# Patient Record
Sex: MALE | Race: WHITE | Employment: OTHER | URBAN - METROPOLITAN AREA
[De-identification: names, ages, dates, MRNs, and addresses within clinical notes are randomized per-mention and may not be internally consistent; named-entity substitution may affect disease eponyms.]

---

## 2020-03-02 ENCOUNTER — OFFICE VISIT (OUTPATIENT)
Dept: URGENT CARE | Facility: URGENT CARE | Age: 75
End: 2020-03-02
Payer: COMMERCIAL

## 2020-03-02 VITALS
TEMPERATURE: 97.9 F | BODY MASS INDEX: 29.62 KG/M2 | SYSTOLIC BLOOD PRESSURE: 99 MMHG | OXYGEN SATURATION: 97 % | WEIGHT: 200 LBS | HEIGHT: 69 IN | HEART RATE: 110 BPM | DIASTOLIC BLOOD PRESSURE: 66 MMHG

## 2020-03-02 DIAGNOSIS — R00.0 SINUS TACHYCARDIA: ICD-10-CM

## 2020-03-02 DIAGNOSIS — R19.7 VOMITING AND DIARRHEA: Primary | ICD-10-CM

## 2020-03-02 DIAGNOSIS — R25.2 LEG CRAMPING: ICD-10-CM

## 2020-03-02 DIAGNOSIS — R11.10 VOMITING AND DIARRHEA: Primary | ICD-10-CM

## 2020-03-02 DIAGNOSIS — E86.1 HYPOTENSION DUE TO HYPOVOLEMIA: ICD-10-CM

## 2020-03-02 DIAGNOSIS — E86.0 DEHYDRATION: ICD-10-CM

## 2020-03-02 DIAGNOSIS — R23.4: ICD-10-CM

## 2020-03-02 DIAGNOSIS — K52.9 GASTROENTERITIS: ICD-10-CM

## 2020-03-02 PROCEDURE — 99204 OFFICE O/P NEW MOD 45 MIN: CPT | Performed by: INTERNAL MEDICINE

## 2020-03-02 RX ORDER — ATORVASTATIN CALCIUM 40 MG/1
40 TABLET, FILM COATED ORAL DAILY
COMMUNITY

## 2020-03-02 RX ORDER — LISINOPRIL 20 MG/1
20 TABLET ORAL DAILY
COMMUNITY

## 2020-03-02 RX ORDER — NICOTINE POLACRILEX 4 MG/1
20 GUM, CHEWING ORAL DAILY
COMMUNITY

## 2020-03-02 RX ORDER — ASPIRIN 81 MG/1
81 TABLET ORAL DAILY
COMMUNITY

## 2020-03-02 RX ORDER — LEVOTHYROXINE SODIUM 175 UG/1
175 TABLET ORAL DAILY
COMMUNITY

## 2020-03-02 RX ORDER — ONDANSETRON 8 MG/1
8 TABLET, ORALLY DISINTEGRATING ORAL EVERY 8 HOURS PRN
Qty: 30 TABLET | Refills: 0 | Status: SHIPPED | OUTPATIENT
Start: 2020-03-02

## 2020-03-02 RX ORDER — ONDANSETRON 4 MG/1
8 TABLET, ORALLY DISINTEGRATING ORAL ONCE
Status: COMPLETED | OUTPATIENT
Start: 2020-03-02 | End: 2020-03-02

## 2020-03-02 RX ADMIN — ONDANSETRON 8 MG: 4 TABLET, ORALLY DISINTEGRATING ORAL at 13:46

## 2020-03-02 ASSESSMENT — ENCOUNTER SYMPTOMS
FEVER: 0
VOMITING: 1
SORE THROAT: 0
ROS GI COMMENTS: MILD ABD PAIN
LIGHT-HEADEDNESS: 0
NAUSEA: 0
DIZZINESS: 0
DIARRHEA: 1
PALPITATIONS: 0
COUGH: 0
SLEEP DISTURBANCE: 1
HEADACHES: 0
MYALGIAS: 0
ADENOPATHY: 0
BLOOD IN STOOL: 0

## 2020-03-02 ASSESSMENT — MIFFLIN-ST. JEOR: SCORE: 1647.57

## 2020-03-02 NOTE — PROGRESS NOTES
SUBJECTIVE:   Suresh Poe is a 74 year old male presenting with a chief complaint of   Chief Complaint   Patient presents with     Urgent Care     Pt in clinic c/o vomiting and diarrhea.     Vomiting     Diarrhea       He is a new patient of [facility].    Gastro    Onset of symptoms was 1 day(s) ago.    Current and Associated symptoms:  vomiting and diarrhea  Aggravating factors: eating.    Alleviating factors:nothing  Diarrhea: Yes  Every 1/2 hour has stools/day and is persisting  Stools: watery  Vomitting: Yes  multiple times/day  Appetite: poor  Risk factors:   At [employer] yesterday morning with luncheon    otherwise unknown sick contacts, travel  and recent antibiotic use      Review of Systems   Constitutional: Negative for fever.   HENT: Negative for sore throat.    Eyes: Negative for visual disturbance.   Respiratory: Negative for cough.    Cardiovascular: Negative for palpitations.   Gastrointestinal: Positive for diarrhea and vomiting. Negative for blood in stool and nausea.        Mild abd pain   Genitourinary: Positive for decreased urine volume.   Musculoskeletal: Negative for myalgias.   Skin: Negative for rash.   Allergic/Immunologic: Negative for food allergies.   Neurological: Negative for dizziness, light-headedness and headaches.   Hematological: Negative for adenopathy.   Psychiatric/Behavioral: Positive for sleep disturbance.       Past Medical History:   Diagnosis Date     Acid reflux      Diabetes (H)      Hyperlipidemia      Hypertension      Hypothyroidism        Family History   Problem Relation Age of Onset     Kidney failure Mother         Refused hemodialysis.  Diet early 90s.     Cancer Father         Mesothelioma     Current Outpatient Medications   Medication Sig Dispense Refill     aspirin 81 MG EC tablet Take 81 mg by mouth daily       atorvastatin (LIPITOR) 40 MG tablet Take 40 mg by mouth daily       levothyroxine (SYNTHROID/LEVOTHROID) 175 MCG tablet Take 175 mcg by mouth daily    "    lisinopril (ZESTRIL) 20 MG tablet Take 20 mg by mouth daily       metFORMIN (GLUCOPHAGE) 500 MG tablet Take 500 mg by mouth 2 times daily (with meals)       omeprazole 20 MG tablet Take 20 mg by mouth daily       ondansetron (ZOFRAN-ODT) 8 MG ODT tab Take 1 tablet (8 mg) by mouth every 8 hours as needed for nausea 30 tablet 0     Social History     Tobacco Use     Smoking status: Former Smoker     Smokeless tobacco: Never Used   Substance Use Topics     Alcohol use: Not on file       OBJECTIVE  BP 99/66   Pulse 110   Temp 97.9  F (36.6  C) (Oral)   Ht 1.753 m (5' 9\")   Wt 90.7 kg (200 lb)   SpO2 97%   BMI 29.53 kg/m      Physical Exam  Vitals signs reviewed.   Constitutional:       Appearance: He is ill-appearing. He is not toxic-appearing.   HENT:      Right Ear: Tympanic membrane normal.      Left Ear: Tympanic membrane normal.      Nose: Nose normal.      Mouth/Throat:      Mouth: Mucous membranes are moist.      Pharynx: No oropharyngeal exudate.   Eyes:      Conjunctiva/sclera: Conjunctivae normal.   Cardiovascular:      Rate and Rhythm: Regular rhythm. Tachycardia present.      Pulses: Normal pulses.      Heart sounds: Normal heart sounds.   Pulmonary:      Effort: Pulmonary effort is normal.      Breath sounds: Normal breath sounds.   Abdominal:      Palpations: Abdomen is soft.      Tenderness: There is no abdominal tenderness. There is no guarding or rebound.      Comments: Hypoactive bowel sounds   Skin:     Comments: Decreased skin turgor on forearms   Neurological:      General: No focal deficit present.      Mental Status: He is alert.         Labs:  No results found for this or any previous visit (from the past 24 hour(s)).        ASSESSMENT:      ICD-10-CM    1. Vomiting and diarrhea R11.10 ondansetron (ZOFRAN-ODT) ODT tab 8 mg    R19.7 ondansetron (ZOFRAN-ODT) 8 MG ODT tab   2. Gastroenteritis K52.9 ondansetron (ZOFRAN-ODT) ODT tab 8 mg     ondansetron (ZOFRAN-ODT) 8 MG ODT tab   3. " Dehydration E86.0 ondansetron (ZOFRAN-ODT) ODT tab 8 mg     ondansetron (ZOFRAN-ODT) 8 MG ODT tab   4. Sinus tachycardia R00.0 ondansetron (ZOFRAN-ODT) ODT tab 8 mg     ondansetron (ZOFRAN-ODT) 8 MG ODT tab   5. Hypotension due to hypovolemia I95.89 ondansetron (ZOFRAN-ODT) 8 MG ODT tab    E86.1    6. Loss of skin turgor R23.8    7. Leg cramping R25.2         Medical Decision Making:  Patient informed me that he did not want to go to the hospital for IV fluids.  I did give him Zofran to see if it help with his nausea in the office and a prescription.    I strongly recommended for his friend drive him to the ER from this urgent care visit.  Friend plans to drive him to [facility].    Suspect illness is from [facility] yesterday.  Potentially exposed to virus versus food poisoning    Patient Instructions   You were given Zofran 8 mg for your nausea in clinic.    I also sent a prescription to your pharmacy up to 3 times a day to help prevent the vomiting symptoms you are having.    Hopefully you will be able to keep down clear liquids and advance diet as tolerated with this medicine.    Due to your symptoms though of low blood pressure high pulse leg cramps and decreased urine output, I I highly recommend going to the emergency room for IV fluids.    If you elect not to if you are not turning around in the next 8 hours I would then go to the emergency room.      Patient Education     Nonspecific Vomiting and Diarrhea (Adult)  Vomiting and diarrhea can have many causes, including:    Helping your body get rid of harmful substances     Gastroenteritis caused by viruses, parasites, bacteria, or toxins.    Allergy to or side effect of a food or medicine    Severe stress or worry (anxiety)     Other illnesses    Pregnancy  It is often hard to pinpoint an exact cause, even with testing. Vomiting and diarrhea often go away within a day or two without problems. If they continue, though, they can lead to too much  loss of fluid (dehydration). This can be serious if not treated.    Home care  Medicines    You may use acetaminophen or NSAID medicines like ibuprofen or naproxen to control fever, unless another medicine was prescribed. If you have chronic liver or kidney disease, talk with your healthcare provider before using these medicines. Also talk with your provider if you've had a stomach ulcer or gastrointestinal bleeding. Don't give aspirin to anyone under 18 years of age who is ill with a fever because it may cause severe disease or death. Don't use NSAID medicines if you are already taking one for another condition (like arthritis) or are on aspirin (such as for heart disease or after a stroke)    Over-the-counter medicines for diarrhea, nausea, and vomiting are generally OK unless you have bleeding, fever, or severe abdominal pain.  General care    If symptoms are severe, rest at home for the next 24 hours, or until you are feeling better.    Washing your hands with soap and water, or using alcohol-based hand  is the best way to stop the spread of infection. Wash your hands after touching anyone who is sick.    Wash your hands after using the toilet and before meals. Clean the toilet after each use.    Dry your hands with a single use towel.    Caffeine, tobacco, and alcohol can make the diarrhea, cramping, and pain worse. Remember, caffeine not only is in coffee, but also is in chocolate, some energy drinks, and teas.  Diet    Water and clear liquids are important so you don't get dehydrated. Drink a small amount at a time. Don't guzzle down the drinks. That may increase your nausea, make cramping worse, and cause the drinks to come back up.    Sports drinks may also help if you are healthy and not too dehydrated. They have too much sugar and not enough electrolytes and can sometimes make things worse. Also, don't drink beverages that are too acidic, like orange juice and grape juice.    If you are very  dehydrated, commercially available products called oral rehydration solutions are best.  Food    Don't force yourself to eat, especially if you have cramps, diarrhea, or vomiting. Eat just a little at a time, and then wait a few minutes before you try to eat more.    Don't eat fatty, greasy, spicy, or fried foods.    Don't eat dairy products if you have diarrhea. They can make it worse.  During the first 24 hours (the first full day), follow the diet below:    Beverages: Oral rehydration solutions, sports drinks, soft drinks without caffeine, mineral water, and decaffeinated tea and coffee    Soups: Clear broth, consommé, and bouillon    Desserts: Plain gelatin, popsicles, and fruit juice bars  During the next 24 hours (the second day), you may add the following to the above if you are better. If not, continue what you did the first day:    Hot cereal, plain toast, bread, rolls, crackers    Plain noodles, rice, mashed potatoes, chicken noodle or rice soup    Unsweetened canned fruit (avoid pineapple), bananas    Limit fat intake to less than 15 grams per day by avoiding margarine, butter, oils, mayonnaise, sauces, gravies, fried foods, peanut butter, meat, poultry, and fish.    Limit fiber. Avoid raw or cooked vegetables, fresh fruits (except bananas) and bran cereals.    Limit caffeine and chocolate. No spices or seasonings except salt.  During the next 24 hours:    Gradually resume a normal diet, as you feel better and your symptoms improve.    If at any time your symptoms start getting worse again, go back to clear liquids until you feel better.  Food preparation    If you have diarrhea, you should not prepare food for others. When preparing foods, wash your hands before and after.    Wash your hands or use alcohol-based  after using cutting boards, countertops, and knives that have been in contact with raw food.    Dry your hands with a single use towel.    Keep uncooked meats away from cooked and  ready-to-eat foods.    Follow-up care  Follow up with your healthcare provider, or as advised. Call if you don't get better in the next 2 to 3 days. If a stool (diarrhea) sample was taken, or cultures done, you will be told if they are positive, or if your treatment needs to be changed. You may call as directed for the results.  If X-rays were taken, you will be notified of any new findings that may affect your care  Call 911  Call 911 if any of these occur:    Trouble breathing    Chest pain    Confusion    Severe drowsiness or trouble awakening    Fainting or loss of consciousness    Rapid heart rate    Seizure    Stiff neck    Severe weakness, dizziness, or lightheadedness  When to seek medical advice  Call your healthcare provider right away if any of these occur:    Bloody or black vomit or stools    Severe, steady abdominal pain or any abdominal pain that is getting worse    Severe headache or stiff neck    An inability to hold down even sips of liquids for more than 12 hours    Vomiting that lasts more than 24 hours    Diarrhea that lasts more than 24 hours    Fever of 100.4 F (38.0 C) or higher, or as directed by your healthcare provider    Yellowish color to your skin or the whites of your eyes    Signs of dehydration, such as dry mouth, little urine (less than every 6 hours), or very dark urine  Date Last Reviewed: 6/1/2018 2000-2019 [employer]. [address] [place] [address], [place] [address]. All rights reserved. This information is not intended as a substitute for professional medical care. Always follow your healthcare professional's instructions.

## 2020-03-02 NOTE — NURSING NOTE
Clinic Administered Medication Documentation    Oral Medication Documentation    Patient was given Ondansetron (Zofran) . Prior to medication administration, verified patients identity using patient s name and date of birth. Please see MAR and medication order for additional information.     Was entire amount of medication used? Yes  Expiration Date: 06/2021

## 2020-03-02 NOTE — PATIENT INSTRUCTIONS
You were given Zofran 8 mg for your nausea in clinic.    I also sent a prescription to your pharmacy up to 3 times a day to help prevent the vomiting symptoms you are having.    Hopefully you will be able to keep down clear liquids and advance diet as tolerated with this medicine.    Due to your symptoms though of low blood pressure high pulse leg cramps and decreased urine output, I I highly recommend going to the emergency room for IV fluids.    If you elect not to if you are not turning around in the next 8 hours I would then go to the emergency room.      Patient Education     Nonspecific Vomiting and Diarrhea (Adult)  Vomiting and diarrhea can have many causes, including:    Helping your body get rid of harmful substances     Gastroenteritis caused by viruses, parasites, bacteria, or toxins.    Allergy to or side effect of a food or medicine    Severe stress or worry (anxiety)     Other illnesses    Pregnancy  It is often hard to pinpoint an exact cause, even with testing. Vomiting and diarrhea often go away within a day or two without problems. If they continue, though, they can lead to too much loss of fluid (dehydration). This can be serious if not treated.    Home care  Medicines    You may use acetaminophen or NSAID medicines like ibuprofen or naproxen to control fever, unless another medicine was prescribed. If you have chronic liver or kidney disease, talk with your healthcare provider before using these medicines. Also talk with your provider if you've had a stomach ulcer or gastrointestinal bleeding. Don't give aspirin to anyone under 18 years of age who is ill with a fever because it may cause severe disease or death. Don't use NSAID medicines if you are already taking one for another condition (like arthritis) or are on aspirin (such as for heart disease or after a stroke)    Over-the-counter medicines for diarrhea, nausea, and vomiting are generally OK unless you have bleeding, fever, or severe  abdominal pain.  General care    If symptoms are severe, rest at home for the next 24 hours, or until you are feeling better.    Washing your hands with soap and water, or using alcohol-based hand  is the best way to stop the spread of infection. Wash your hands after touching anyone who is sick.    Wash your hands after using the toilet and before meals. Clean the toilet after each use.    Dry your hands with a single use towel.    Caffeine, tobacco, and alcohol can make the diarrhea, cramping, and pain worse. Remember, caffeine not only is in coffee, but also is in chocolate, some energy drinks, and teas.  Diet    Water and clear liquids are important so you don't get dehydrated. Drink a small amount at a time. Don't guzzle down the drinks. That may increase your nausea, make cramping worse, and cause the drinks to come back up.    Sports drinks may also help if you are healthy and not too dehydrated. They have too much sugar and not enough electrolytes and can sometimes make things worse. Also, don't drink beverages that are too acidic, like orange juice and grape juice.    If you are very dehydrated, commercially available products called oral rehydration solutions are best.  Food    Don't force yourself to eat, especially if you have cramps, diarrhea, or vomiting. Eat just a little at a time, and then wait a few minutes before you try to eat more.    Don't eat fatty, greasy, spicy, or fried foods.    Don't eat dairy products if you have diarrhea. They can make it worse.  During the first 24 hours (the first full day), follow the diet below:    Beverages: Oral rehydration solutions, sports drinks, soft drinks without caffeine, mineral water, and decaffeinated tea and coffee    Soups: Clear broth, consommé, and bouillon    Desserts: Plain gelatin, popsicles, and fruit juice bars  During the next 24 hours (the second day), you may add the following to the above if you are better. If not, continue what you  did the first day:    Hot cereal, plain toast, bread, rolls, crackers    Plain noodles, rice, mashed potatoes, chicken noodle or rice soup    Unsweetened canned fruit (avoid pineapple), bananas    Limit fat intake to less than 15 grams per day by avoiding margarine, butter, oils, mayonnaise, sauces, gravies, fried foods, peanut butter, meat, poultry, and fish.    Limit fiber. Avoid raw or cooked vegetables, fresh fruits (except bananas) and bran cereals.    Limit caffeine and chocolate. No spices or seasonings except salt.  During the next 24 hours:    Gradually resume a normal diet, as you feel better and your symptoms improve.    If at any time your symptoms start getting worse again, go back to clear liquids until you feel better.  Food preparation    If you have diarrhea, you should not prepare food for others. When preparing foods, wash your hands before and after.    Wash your hands or use alcohol-based  after using cutting boards, countertops, and knives that have been in contact with raw food.    Dry your hands with a single use towel.    Keep uncooked meats away from cooked and ready-to-eat foods.    Follow-up care  Follow up with your healthcare provider, or as advised. Call if you don't get better in the next 2 to 3 days. If a stool (diarrhea) sample was taken, or cultures done, you will be told if they are positive, or if your treatment needs to be changed. You may call as directed for the results.  If X-rays were taken, you will be notified of any new findings that may affect your care  Call 911  Call 911 if any of these occur:    Trouble breathing    Chest pain    Confusion    Severe drowsiness or trouble awakening    Fainting or loss of consciousness    Rapid heart rate    Seizure    Stiff neck    Severe weakness, dizziness, or lightheadedness  When to seek medical advice  Call your healthcare provider right away if any of these occur:    Bloody or black vomit or stools    Severe, steady  abdominal pain or any abdominal pain that is getting worse    Severe headache or stiff neck    An inability to hold down even sips of liquids for more than 12 hours    Vomiting that lasts more than 24 hours    Diarrhea that lasts more than 24 hours    Fever of 100.4 F (38.0 C) or higher, or as directed by your healthcare provider    Yellowish color to your skin or the whites of your eyes    Signs of dehydration, such as dry mouth, little urine (less than every 6 hours), or very dark urine  Date Last Reviewed: 6/1/2018 2000-2019 [employer]. [address] [place] [address]. All rights reserved. This information is not intended as a substitute for professional medical care. Always follow your healthcare professional's instructions.

## 2020-03-09 ENCOUNTER — RECORDS - HEALTHEAST (OUTPATIENT)
Dept: LAB | Facility: CLINIC | Age: 75
End: 2020-03-09

## 2020-03-09 LAB
ALBUMIN SERPL-MCNC: 3.5 G/DL (ref 3.5–5)
ALP SERPL-CCNC: 97 U/L (ref 45–120)
ALT SERPL W P-5'-P-CCNC: 16 U/L (ref 0–45)
ANION GAP SERPL CALCULATED.3IONS-SCNC: 10 MMOL/L (ref 5–18)
AST SERPL W P-5'-P-CCNC: 13 U/L (ref 0–40)
BILIRUB SERPL-MCNC: 0.7 MG/DL (ref 0–1)
BUN SERPL-MCNC: 9 MG/DL (ref 8–28)
CALCIUM SERPL-MCNC: 9.3 MG/DL (ref 8.5–10.5)
CHLORIDE BLD-SCNC: 105 MMOL/L (ref 98–107)
CHOLEST SERPL-MCNC: 69 MG/DL
CO2 SERPL-SCNC: 28 MMOL/L (ref 22–31)
CREAT SERPL-MCNC: 0.92 MG/DL (ref 0.7–1.3)
FASTING STATUS PATIENT QL REPORTED: YES
GFR SERPL CREATININE-BSD FRML MDRD: >60 ML/MIN/1.73M2
GLUCOSE BLD-MCNC: 101 MG/DL (ref 70–125)
HBA1C MFR BLD: 6.8 %
HDLC SERPL-MCNC: 25 MG/DL
LDLC SERPL CALC-MCNC: 26 MG/DL
POTASSIUM BLD-SCNC: 4.5 MMOL/L (ref 3.5–5)
PROT SERPL-MCNC: 6.1 G/DL (ref 6–8)
SODIUM SERPL-SCNC: 143 MMOL/L (ref 136–145)
TRIGL SERPL-MCNC: 88 MG/DL

## 2020-08-11 ENCOUNTER — RECORDS - HEALTHEAST (OUTPATIENT)
Dept: LAB | Facility: CLINIC | Age: 75
End: 2020-08-11

## 2020-08-11 LAB
ALBUMIN SERPL-MCNC: 3.3 G/DL (ref 3.5–5)
ALP SERPL-CCNC: 128 U/L (ref 45–120)
ALT SERPL W P-5'-P-CCNC: 11 U/L (ref 0–45)
ANION GAP SERPL CALCULATED.3IONS-SCNC: 9 MMOL/L (ref 5–18)
AST SERPL W P-5'-P-CCNC: 8 U/L (ref 0–40)
BASOPHILS # BLD AUTO: 0 THOU/UL (ref 0–0.2)
BASOPHILS NFR BLD AUTO: 0 % (ref 0–2)
BILIRUB SERPL-MCNC: 0.7 MG/DL (ref 0–1)
BUN SERPL-MCNC: 11 MG/DL (ref 8–28)
CALCIUM SERPL-MCNC: 9.4 MG/DL (ref 8.5–10.5)
CHLORIDE BLD-SCNC: 104 MMOL/L (ref 98–107)
CO2 SERPL-SCNC: 25 MMOL/L (ref 22–31)
CREAT SERPL-MCNC: 1.1 MG/DL (ref 0.7–1.3)
EOSINOPHIL # BLD AUTO: 0.1 THOU/UL (ref 0–0.4)
EOSINOPHIL NFR BLD AUTO: 1 % (ref 0–6)
ERYTHROCYTE [DISTWIDTH] IN BLOOD BY AUTOMATED COUNT: 12.3 % (ref 11–14.5)
GFR SERPL CREATININE-BSD FRML MDRD: >60 ML/MIN/1.73M2
GLUCOSE BLD-MCNC: 378 MG/DL (ref 70–125)
HBA1C MFR BLD: 7.8 %
HCT VFR BLD AUTO: 44.2 % (ref 40–54)
HGB BLD-MCNC: 13.7 G/DL (ref 14–18)
LYMPHOCYTES # BLD AUTO: 1.3 THOU/UL (ref 0.8–4.4)
LYMPHOCYTES NFR BLD AUTO: 17 % (ref 20–40)
MCH RBC QN AUTO: 27.3 PG (ref 27–34)
MCHC RBC AUTO-ENTMCNC: 31 G/DL (ref 32–36)
MCV RBC AUTO: 88 FL (ref 80–100)
MONOCYTES # BLD AUTO: 0.7 THOU/UL (ref 0–0.9)
MONOCYTES NFR BLD AUTO: 9 % (ref 2–10)
NEUTROPHILS # BLD AUTO: 5.6 THOU/UL (ref 2–7.7)
NEUTROPHILS NFR BLD AUTO: 73 % (ref 50–70)
PLATELET # BLD AUTO: 310 THOU/UL (ref 140–440)
PMV BLD AUTO: 11.4 FL (ref 8.5–12.5)
POTASSIUM BLD-SCNC: 4.7 MMOL/L (ref 3.5–5)
PROT SERPL-MCNC: 6.7 G/DL (ref 6–8)
RBC # BLD AUTO: 5.02 MILL/UL (ref 4.4–6.2)
SODIUM SERPL-SCNC: 138 MMOL/L (ref 136–145)
WBC: 7.7 THOU/UL (ref 4–11)